# Patient Record
Sex: MALE | Race: WHITE
[De-identification: names, ages, dates, MRNs, and addresses within clinical notes are randomized per-mention and may not be internally consistent; named-entity substitution may affect disease eponyms.]

---

## 2018-03-06 NOTE — MH
cc:

Sean Lima MD

****

 

 

DATE OF ADMISSION:

03/12/2018

 

ADMISSION DIAGNOSIS:

Cataract left eye.

 

HISTORY OF PRESENT ILLNESS:

This 77-year-old white male is coming through [facility] for the purpose of a lens extraction of the left eye with 

intraocular lens implant under local anesthesia.  He has noticed 

decreasing visual acuity interfering with his daily activities and 

elected to have the above procedure.  His best corrected visual acuity

is 20/40 -2 in the right eye and 20/50 -1 in the left eye in room 

light.

 

PAST MEDICAL HISTORY:

The patient has a history of hypertension, ankylosing spondylitis, 

atrial fibrillation, cholesterol problems, and Crohn's disease.

 

PAST SURGICAL HISTORY:

Bilateral hip replacement and revisions, intestinal surgery for 

Crohn's disease, hernia surgery twice.

 

DAILY MEDICATIONS:

Losartan, hydrochlorothiazide, Levitra, B-complex, hydrocodone p.r.n.,

Crestor, propafenone, Eliquis, amlodipine, furosemide, Flonase nasal 

spray.

 

ALLERGIES:

LISINOPRIL

 

SOCIAL HISTORY:

He does not smoke and drinks alcohol twice a month.

 

FAMILY HISTORY:

Positive for father with cataract and macular degeneration.

 

REVIEW OF SYSTEMS:

HEAD:  Patient denies severe headaches, dizziness or recent head 

injury.

EARS:  Patient denies hearing loss, ear pain, discharge.  The patient 

has tinnitus, ringing in the ears bilaterally for many years.

NOSE:  Patient denies nasal discharge, obstruction or frequent colds.

MOUTH AND THROAT:  Patient denies soreness of the mouth or tongue, 

bleeding gums, trouble swallowing, changes in voice or sore throat.

NECK:  Patient denies neck pain or swelling.  Due to his ankylosing 

spondylitis he has some limitation of neck movement. No neck injury.

CARDIOPULMONARY SYSTEM:  Patient denies shortness of breath, 

orthopnea, chronic cough, sputum production, hemoptysis, chest pain, 

wheezing, palpitations or light-headedness.

GI SYSTEM:  Patient denies poor appetite, nausea, vomiting, abdominal 

pain, ulcers, hemorrhoids or change in bowel habits.

 SYSTEM:  The patient has urinary frequency day and gets up twice at

night to urinate. No dysuria, change in urine color.

NERVOUS SYSTEM:  Patient denies convulsions, vertigo, stroke, numbness

or weakness.

 

PHYSICAL EXAMINATION:

VITAL SIGNS:  Blood pressure 122/64, pulse 84, respirations 16.

HEAD: Normocephalic, atraumatic.

NOSE:  Without rhinorrhea.

THROAT:  Clear.

NECK: Supple.

CHEST:  Clear.

HEART:  Regular rhythm.

ABDOMEN: Without tenderness.

EXTREMITIES:  Without edema.

NEUROLOGIC: Within normal limits.

MENTAL STATUS:  Within normal limits.

 

EYE EXAMINATION:

The patient's best corrected visual acuity is 20/40 -2 in the right 

eye and 20/50 -1 in the left eye in room light.  Visual fields are 

full to confrontation testing.  Extraocular muscle exam reveals full 

versions with orthophoria in the distance and exophoria at near.  

Pupils are 2 mm in the right eye and 2.5 mm in the left, round and 

reactive to light without afferent defect.  Anterior segment 

examination reveals dermatochalasis of the eyelid skin. There are 

nuclear sclerotic and posterior subcapsular cataract changes 

bilaterally.  Intraocular pressure is 17 in the right eye and 14 in 

the left by applanation tonometry.  Dilated fundus exam reveals sharp 

disks with cup-to-disk ratio of 0.3 bilaterally.  There are drusen 

present in the macula bilaterally.  The background is within normal 

limits.

 

IMPRESSION:

1.  Bilateral cataracts.

2.  Macular drusen, both eyes.

3.  Dermatochalasis.

 

PLAN:

Lens extraction of the left eye with intraocular lens implant under 

local anesthesia through [facility].   The patient has

been cleared medically.  He has been counseled as to the risks, 

benefits and alternatives and elected to proceed.  I feel that 

cataract surgery will improve the quality of life and activities of 

daily living in this patient.

 

 

 

 

 

 

 

 

 

__________________________________

MD ELDA Aponte/CHARLOTTE/cheyenne

D: 03/06/2018, 12:06 PM

T: 03/06/2018, 01:05 PM

Visit #: [id number]

Job #: [id number]

## 2018-03-12 ENCOUNTER — HOSPITAL ENCOUNTER (OUTPATIENT)
Dept: HOSPITAL 17 - PHSDC | Age: 78
Discharge: HOME | End: 2018-03-12
Attending: OPHTHALMOLOGY
Payer: MEDICARE

## 2018-03-12 VITALS — WEIGHT: 175.27 LBS | BODY MASS INDEX: 25.96 KG/M2 | HEIGHT: 69 IN

## 2018-03-12 VITALS
TEMPERATURE: 97.6 F | SYSTOLIC BLOOD PRESSURE: 122 MMHG | HEART RATE: 70 BPM | RESPIRATION RATE: 18 BRPM | DIASTOLIC BLOOD PRESSURE: 78 MMHG | OXYGEN SATURATION: 95 %

## 2018-03-12 VITALS — HEART RATE: 70 BPM

## 2018-03-12 VITALS — HEART RATE: 66 BPM

## 2018-03-12 DIAGNOSIS — H26.9: Primary | ICD-10-CM

## 2018-03-12 PROCEDURE — V2632 POST CHMBR INTRAOCULAR LENS: HCPCS

## 2018-03-12 PROCEDURE — 00142 ANES PX ON EYE LENS SURGERY: CPT

## 2018-03-12 PROCEDURE — 66984 XCAPSL CTRC RMVL W/O ECP: CPT

## 2018-03-12 RX ADMIN — CYCLOPENTOLATE HYDROCHLORIDE SCH DROP: 10 SOLUTION/ DROPS OPHTHALMIC at 07:02

## 2018-03-12 RX ADMIN — DICLOFENAC SODIUM SCH DROP: 1 SOLUTION/ DROPS OPHTHALMIC at 06:59

## 2018-03-12 RX ADMIN — TROPICAMIDE SCH DROP: 10 SOLUTION/ DROPS OPHTHALMIC at 06:59

## 2018-03-12 RX ADMIN — TROPICAMIDE SCH DROP: 10 SOLUTION/ DROPS OPHTHALMIC at 07:05

## 2018-03-12 RX ADMIN — DICLOFENAC SODIUM SCH DROP: 1 SOLUTION/ DROPS OPHTHALMIC at 07:02

## 2018-03-12 RX ADMIN — PHENYLEPHRINE HYDROCHLORIDE SCH DROP: 25 SOLUTION/ DROPS OPHTHALMIC at 06:56

## 2018-03-12 RX ADMIN — PHENYLEPHRINE HYDROCHLORIDE SCH DROP: 25 SOLUTION/ DROPS OPHTHALMIC at 06:59

## 2018-03-12 RX ADMIN — CYCLOPENTOLATE HYDROCHLORIDE SCH DROP: 10 SOLUTION/ DROPS OPHTHALMIC at 07:05

## 2018-03-12 RX ADMIN — GATIFLOXACIN SCH DROP: 5 SOLUTION/ DROPS OPHTHALMIC at 06:56

## 2018-03-12 RX ADMIN — TROPICAMIDE SCH DROP: 10 SOLUTION/ DROPS OPHTHALMIC at 06:56

## 2018-03-12 RX ADMIN — DICLOFENAC SODIUM SCH DROP: 1 SOLUTION/ DROPS OPHTHALMIC at 06:56

## 2018-03-12 RX ADMIN — GATIFLOXACIN SCH DROP: 5 SOLUTION/ DROPS OPHTHALMIC at 07:02

## 2018-03-12 RX ADMIN — CYCLOPENTOLATE HYDROCHLORIDE SCH DROP: 10 SOLUTION/ DROPS OPHTHALMIC at 06:56

## 2018-03-12 RX ADMIN — PHENYLEPHRINE HYDROCHLORIDE SCH DROP: 25 SOLUTION/ DROPS OPHTHALMIC at 07:02

## 2018-03-12 RX ADMIN — DICLOFENAC SODIUM SCH DROP: 1 SOLUTION/ DROPS OPHTHALMIC at 07:05

## 2018-03-12 RX ADMIN — TROPICAMIDE SCH DROP: 10 SOLUTION/ DROPS OPHTHALMIC at 07:02

## 2018-03-12 RX ADMIN — PHENYLEPHRINE HYDROCHLORIDE SCH DROP: 25 SOLUTION/ DROPS OPHTHALMIC at 07:05

## 2018-03-12 RX ADMIN — GATIFLOXACIN SCH DROP: 5 SOLUTION/ DROPS OPHTHALMIC at 07:05

## 2018-03-12 RX ADMIN — CYCLOPENTOLATE HYDROCHLORIDE SCH DROP: 10 SOLUTION/ DROPS OPHTHALMIC at 06:59

## 2018-03-12 RX ADMIN — GATIFLOXACIN SCH DROP: 5 SOLUTION/ DROPS OPHTHALMIC at 06:59

## 2018-03-12 NOTE — MP
cc:

Sean Lima MD

****

 

 

DATE OF OPERATION:

03/12/2018

 

PREOPERATIVE DIAGNOSIS:

Cataract left eye.

 

POSTOPERATIVE DIAGNOSIS:

Cataract left eye.

 

OPERATION:

Extracapsular cataract extraction with posterior chamber intraocular 

lens implant by phacoemulsification, left eye.

 

SURGEON:

Sean Lima M.D.

 

ANESTHESIA:

Local.

 

COMPLICATIONS:

None.

 

INDICATIONS:

See history and physical previously dictated.

 

OPERATIVE PROCEDURE:

The patient had adequate retrobulbar and eyelid blocks administered in

the holding area and was brought to the operating room.  The left eye 

was prepped and draped in the usual sterile ophthalmic manner.  A lid 

speculum was inserted in the left eye.  A 4-0 silk bridle suture was 

placed through the conjunctiva near the superior rectus muscle and it 

was tagged to the drape.  A fornix-based conjunctival flap was 

prepared spanning approximately 5 mm in width.  Hemostasis was 

obtained with wet-field cautery.  A 3.5 mm groove was made 1 mm from 

the limbus and dissected up to the limbus in the form of a scleral 

pocket incision.  A stab incision was then made at the 2 o'clock 

position.  Viscoelastic was injected into the anterior chamber.  The 

anterior chamber was entered with a 2.75 mm keratome through the 

scleral pocket incision.  A 360 degree continuous curvilinear 

capsulorrhexis was then performed.  Hydrodissection was utilized to 

divide the nucleus into inner and outer components and to separate the

cortex from the capsule.  Phacoemulsification was then utilized to 

remove the nucleus.  The outer nuclear layer was removed with 

irrigation and aspiration and short bursts of ultrasound as necessary.

 The cortex was removed with the irrigation/aspiration handpiece.  The

posterior capsule was polished with the capsule polisher.  

Viscoelastic was injected into the capsular bag.  The intraocular lens

was inspected and found to be in good condition.  The lens utilized 

was an Tommy, model number SA60AT with a power of +19.5 Diopters.  The

lens was inserted into the capsular bag.  The viscoelastic in the 

anterior chamber was then removed with the irrigation-aspiration 

handpiece.  Viscoelastic was also removed from beneath the intraocular

lens.  The anterior chamber was filled with Miochol-E through the stab

incision and pressurized.  The wound was checked for leaks at this 

pressure and normalized pressure and there were none.  The 4-0 bridle 

suture was removed.  The conjunctival flap was brought down over the 

wound and secured with cautery.  Pilocarpine 2% eye drops were 

instilled topically.  The lid speculum was removed.  TobraDex 

ophthalmic ointment was applied.  The eye was double patched and 

shielded.

 

The patient tolerated the procedure well and left the Operating Room 

in satisfactory condition.

 

Note:  A side port incision was made at the 4 o'clock position.

 

 

__________________________________

MD ELDA Aponte/CIARRA

D: 03/12/2018, 09:04 AM

T: 03/12/2018, 09:18 AM

Visit #: [id number]

Job #: [id number]

## 2022-06-27 ENCOUNTER — APPOINTMENT (RX ONLY)
Dept: URBAN - METROPOLITAN AREA CLINIC 52 | Facility: CLINIC | Age: 82
Setting detail: DERMATOLOGY
End: 2022-06-27

## 2022-06-27 DIAGNOSIS — L57.8 OTHER SKIN CHANGES DUE TO CHRONIC EXPOSURE TO NONIONIZING RADIATION: ICD-10-CM | Status: INADEQUATELY CONTROLLED

## 2022-06-27 DIAGNOSIS — L57.0 ACTINIC KERATOSIS: ICD-10-CM | Status: INADEQUATELY CONTROLLED

## 2022-06-27 PROCEDURE — ? LIQUID NITROGEN

## 2022-06-27 PROCEDURE — 99203 OFFICE O/P NEW LOW 30 MIN: CPT | Mod: 25

## 2022-06-27 PROCEDURE — ? COUNSELING

## 2022-06-27 PROCEDURE — 17000 DESTRUCT PREMALG LESION: CPT

## 2022-06-27 PROCEDURE — ? SUNSCREEN RECOMMENDATIONS

## 2022-06-27 PROCEDURE — ? FOLLOW UP FOR NEXT VISIT

## 2022-06-27 ASSESSMENT — LOCATION DETAILED DESCRIPTION DERM: LOCATION DETAILED: LEFT POSTERIOR ANKLE

## 2022-06-27 ASSESSMENT — LOCATION SIMPLE DESCRIPTION DERM: LOCATION SIMPLE: LEFT ANKLE

## 2022-06-27 ASSESSMENT — LOCATION ZONE DERM: LOCATION ZONE: LEG

## 2022-06-27 NOTE — PROCEDURE: LIQUID NITROGEN
Render Note In Bullet Format When Appropriate: No
Post-Care Instructions: I reviewed with the patient in detail post-care instructions. Patient is to wear sunprotection, and avoid picking at any of the treated lesions. Pt may apply Vaseline to crusted or scabbing areas.
Render Post-Care Instructions In Note?: yes
Duration Of Freeze Thaw-Cycle (Seconds): 4
Number Of Freeze-Thaw Cycles: 1 freeze-thaw cycle
Detail Level: Detailed
Consent: The patient's consent was obtained including but not limited to risks of crusting, scabbing, blistering, scarring, darker or lighter pigmentary change, recurrence, incomplete removal and infection.

## 2022-06-27 NOTE — PROCEDURE: FOLLOW UP FOR NEXT VISIT
Detail Level: Detailed
Scheduled For Follow Up In (Optional): 2 month further evaluation and management

## 2022-08-29 ENCOUNTER — APPOINTMENT (RX ONLY)
Dept: URBAN - METROPOLITAN AREA CLINIC 52 | Facility: CLINIC | Age: 82
Setting detail: DERMATOLOGY
End: 2022-08-29

## 2022-08-29 ENCOUNTER — RX ONLY (OUTPATIENT)
Age: 82
Setting detail: RX ONLY
End: 2022-08-29

## 2022-08-29 DIAGNOSIS — D18.0 HEMANGIOMA: ICD-10-CM | Status: STABLE

## 2022-08-29 DIAGNOSIS — L85.3 XEROSIS CUTIS: ICD-10-CM | Status: INADEQUATELY CONTROLLED

## 2022-08-29 DIAGNOSIS — L57.0 ACTINIC KERATOSIS: ICD-10-CM | Status: RESOLVING

## 2022-08-29 DIAGNOSIS — D69.2 OTHER NONTHROMBOCYTOPENIC PURPURA: ICD-10-CM | Status: INADEQUATELY CONTROLLED

## 2022-08-29 DIAGNOSIS — L82.1 OTHER SEBORRHEIC KERATOSIS: ICD-10-CM

## 2022-08-29 DIAGNOSIS — L57.8 OTHER SKIN CHANGES DUE TO CHRONIC EXPOSURE TO NONIONIZING RADIATION: ICD-10-CM | Status: INADEQUATELY CONTROLLED

## 2022-08-29 PROBLEM — D18.01 HEMANGIOMA OF SKIN AND SUBCUTANEOUS TISSUE: Status: ACTIVE | Noted: 2022-08-29

## 2022-08-29 PROCEDURE — ? COUNSELING

## 2022-08-29 PROCEDURE — 99214 OFFICE O/P EST MOD 30 MIN: CPT | Mod: 25

## 2022-08-29 PROCEDURE — 17000 DESTRUCT PREMALG LESION: CPT

## 2022-08-29 PROCEDURE — ? PRESCRIPTION MEDICATION MANAGEMENT

## 2022-08-29 PROCEDURE — ? SUNSCREEN RECOMMENDATIONS

## 2022-08-29 PROCEDURE — 17003 DESTRUCT PREMALG LES 2-14: CPT

## 2022-08-29 PROCEDURE — ? FOLLOW UP FOR NEXT VISIT

## 2022-08-29 PROCEDURE — ? TREATMENT REGIMEN

## 2022-08-29 PROCEDURE — ? LIQUID NITROGEN

## 2022-08-29 PROCEDURE — ? ADDITIONAL NOTES

## 2022-08-29 PROCEDURE — ? PRESCRIPTION

## 2022-08-29 RX ORDER — AMMONIUM LACTATE 12 G/100G
1 CREAM TOPICAL BID
Qty: 140 | Refills: 3 | Status: ERX | COMMUNITY
Start: 2022-08-29

## 2022-08-29 RX ORDER — AMMONIUM LACTATE 12 %
LOTION (GRAM) TOPICAL
Qty: 396 | Refills: 0 | Status: CANCELLED

## 2022-08-29 ASSESSMENT — LOCATION DETAILED DESCRIPTION DERM
LOCATION DETAILED: LEFT PROXIMAL DORSAL FOREARM
LOCATION DETAILED: RIGHT PROXIMAL PRETIBIAL REGION
LOCATION DETAILED: LEFT DISTAL PRETIBIAL REGION
LOCATION DETAILED: RIGHT SUPERIOR LATERAL FOREHEAD
LOCATION DETAILED: RIGHT MEDIAL FOREHEAD
LOCATION DETAILED: RIGHT PROXIMAL DORSAL FOREARM
LOCATION DETAILED: RIGHT DISTAL PRETIBIAL REGION
LOCATION DETAILED: LEFT POSTERIOR SHOULDER
LOCATION DETAILED: LEFT MEDIAL PROXIMAL PRETIBIAL REGION
LOCATION DETAILED: LEFT PROXIMAL PRETIBIAL REGION

## 2022-08-29 ASSESSMENT — LOCATION ZONE DERM
LOCATION ZONE: LEG
LOCATION ZONE: FACE
LOCATION ZONE: ARM
LOCATION ZONE: ARM

## 2022-08-29 ASSESSMENT — LOCATION SIMPLE DESCRIPTION DERM
LOCATION SIMPLE: RIGHT FOREHEAD
LOCATION SIMPLE: RIGHT PRETIBIAL REGION
LOCATION SIMPLE: RIGHT FOREARM
LOCATION SIMPLE: LEFT PRETIBIAL REGION
LOCATION SIMPLE: LEFT FOREARM
LOCATION SIMPLE: LEFT SHOULDER

## 2022-08-29 ASSESSMENT — BSA RASH: BSA RASH: 5

## 2022-08-29 NOTE — HPI: EVALUATION OF SKIN LESION(S)
What Type Of Note Output Would You Prefer (Optional)?: Standard Output
Hpi Title: Evaluation of Skin Lesions
How Severe Are Your Spot(S)?: mild
Have Your Spot(S) Been Treated In The Past?: has not been treated
Year Removed: 1900
Additional History: treated LN2 site x 1 with antibiotic cream then left alone  Not healing

## 2022-08-29 NOTE — PROCEDURE: PRESCRIPTION MEDICATION MANAGEMENT
Initiate Treatment: ammonium lactate 12 % lotion \\nQuantity: 396.0 g  Days Supply: 30\\nSig: Apply to legs and arms bid\\n\\nOTC Glycolic acid body lotion
Discontinue Regimen: short length clothing
Render In Strict Bullet Format?: No
Detail Level: Zone
Initiate Treatment: ammoniated lactate 12% cream bid \\nUVR protection

## 2022-08-29 NOTE — PROCEDURE: TREATMENT REGIMEN
Detail Level: Zone
Otc Regimen: Begin the following treatments: OTC emollient or Ammoniated Lactate 12% BID as directed.\\nPlan: emollient acidifying cleansers\\npo water ~3 litres per day.
Detail Level: Detailed
Initiate Treatment: Wear Sun Protective clothing\\n20 minutes prior to UV exposure, apply full spectrum sunscreen with SPF at least 15 Repeat every 2 hours
Otc Regimen: Begin the following treatments: Sunscreen\\nAHA body lotion and face cream.\\nPlan: Work with    re sunscreen cosmeceuticals and treatments.
Initiate Treatment: post LN2 site with firm crust :  wet/dry soaks + Vaseline until crusts is removed  then  keep site clean + Vaseline frequently

## 2022-08-29 NOTE — PROCEDURE: LIQUID NITROGEN
Duration Of Freeze Thaw-Cycle (Seconds): 4
Show Aperture Variable?: Yes
Detail Level: Detailed
Consent: The patient's consent was obtained including but not limited to risks of crusting, scabbing, blistering, scarring, darker or lighter pigmentary change, recurrence, incomplete removal and infection.
Post-Care Instructions: I reviewed with the patient in detail post-care instructions. Patient is to wear sunprotection, and avoid picking at any of the treated lesions. Pt may apply Vaseline to crusted or scabbing areas.
Number Of Freeze-Thaw Cycles: 1 freeze-thaw cycle
Render Note In Bullet Format When Appropriate: No

## 2022-10-27 ENCOUNTER — APPOINTMENT (RX ONLY)
Dept: URBAN - METROPOLITAN AREA CLINIC 52 | Facility: CLINIC | Age: 82
Setting detail: DERMATOLOGY
End: 2022-10-27

## 2022-10-27 DIAGNOSIS — Z02.9 ENCOUNTER FOR ADMINISTRATIVE EXAMINATIONS, UNSPECIFIED: ICD-10-CM

## 2022-12-12 ENCOUNTER — APPOINTMENT (RX ONLY)
Dept: URBAN - METROPOLITAN AREA CLINIC 52 | Facility: CLINIC | Age: 82
Setting detail: DERMATOLOGY
End: 2022-12-12

## 2022-12-12 DIAGNOSIS — D69.2 OTHER NONTHROMBOCYTOPENIC PURPURA: ICD-10-CM | Status: IMPROVED

## 2022-12-12 DIAGNOSIS — H01.13 ECZEMATOUS DERMATITIS OF EYELID: ICD-10-CM

## 2022-12-12 DIAGNOSIS — L85.3 XEROSIS CUTIS: ICD-10-CM | Status: IMPROVED

## 2022-12-12 PROBLEM — H01.139 ECZEMATOUS DERMATITIS OF UNSPECIFIED EYE, UNSPECIFIED EYELID: Status: ACTIVE | Noted: 2022-12-12

## 2022-12-12 PROCEDURE — ? TREATMENT REGIMEN

## 2022-12-12 PROCEDURE — ? PRESCRIPTION

## 2022-12-12 PROCEDURE — ? COUNSELING

## 2022-12-12 PROCEDURE — ? FOLLOW UP FOR NEXT VISIT

## 2022-12-12 PROCEDURE — ? PRESCRIPTION MEDICATION MANAGEMENT

## 2022-12-12 PROCEDURE — 99214 OFFICE O/P EST MOD 30 MIN: CPT

## 2022-12-12 RX ORDER — PIMECROLIMUS 10 MG/G
1 CREAM TOPICAL BID
Qty: 30 | Refills: 4 | Status: ERX | COMMUNITY
Start: 2022-12-12

## 2022-12-12 RX ADMIN — PIMECROLIMUS 1: 10 CREAM TOPICAL at 00:00

## 2022-12-12 ASSESSMENT — LOCATION SIMPLE DESCRIPTION DERM
LOCATION SIMPLE: RIGHT FOREARM
LOCATION SIMPLE: LEFT FOREARM
LOCATION SIMPLE: LEFT CHEEK
LOCATION SIMPLE: RIGHT PRETIBIAL REGION
LOCATION SIMPLE: LEFT PRETIBIAL REGION
LOCATION SIMPLE: RIGHT CHEEK

## 2022-12-12 ASSESSMENT — LOCATION DETAILED DESCRIPTION DERM
LOCATION DETAILED: RIGHT SUPERIOR MEDIAL MALAR CHEEK
LOCATION DETAILED: RIGHT PROXIMAL DORSAL FOREARM
LOCATION DETAILED: LEFT PROXIMAL PRETIBIAL REGION
LOCATION DETAILED: RIGHT DISTAL PRETIBIAL REGION
LOCATION DETAILED: LEFT SUPERIOR MEDIAL MALAR CHEEK
LOCATION DETAILED: LEFT DISTAL PRETIBIAL REGION
LOCATION DETAILED: LEFT PROXIMAL DORSAL FOREARM
LOCATION DETAILED: RIGHT PROXIMAL PRETIBIAL REGION

## 2022-12-12 ASSESSMENT — LOCATION ZONE DERM
LOCATION ZONE: FACE
LOCATION ZONE: ARM
LOCATION ZONE: LEG

## 2022-12-12 NOTE — PROCEDURE: FOLLOW UP FOR NEXT VISIT
Instructions (Optional): Follow up
Scheduled For Follow Up In (Optional): 2 months
Detail Level: Generalized

## 2022-12-12 NOTE — PROCEDURE: PRESCRIPTION MEDICATION MANAGEMENT
Initiate Treatment: \\n
Discontinue Regimen: short length clothing
Continue Regimen: ammonium lactate 12 % lotion \\nQuantity: 396.0 g  Days Supply: 30\\nSig: Apply to legs and arms bid\\n\\nOTC Glycolic acid body lotion BID\\n
Plan: Switching to a cleanser
Render In Strict Bullet Format?: No
Detail Level: Zone
Plan: Switching to a cleanser, continue glycolic acid pads.
Continue Regimen: ammoniated lactate 12% cream bid \\nUVR protection
Initiate Treatment: pimecrolimus 1 % topical cream Bid
Plan: We spoke about trying not to rub eyes, spoke about cosmetics surgery and possible lasers, Celia also might have something that could help with eye puffiness.

## 2022-12-12 NOTE — PROCEDURE: TREATMENT REGIMEN
Detail Level: Detailed
Continue Regimen: Wear Sun Protective clothing\\n20 minutes prior to UV exposure, apply full spectrum sunscreen with SPF at least 15 Repeat every 2 hours
Detail Level: Zone
Otc Regimen: Begin the following treatments: OTC emollient or Ammonium Lactate 12% BID as directed.\\nPlan: emollient acidifying cleansers\\npo water ~3 litres per day.

## 2022-12-15 ENCOUNTER — APPOINTMENT (RX ONLY)
Dept: URBAN - METROPOLITAN AREA CLINIC 52 | Facility: CLINIC | Age: 82
Setting detail: DERMATOLOGY
End: 2022-12-15

## 2022-12-15 DIAGNOSIS — H01.13 ECZEMATOUS DERMATITIS OF EYELID: ICD-10-CM

## 2022-12-15 PROBLEM — H01.139 ECZEMATOUS DERMATITIS OF UNSPECIFIED EYE, UNSPECIFIED EYELID: Status: ACTIVE | Noted: 2022-12-15

## 2022-12-15 PROCEDURE — ? PRESCRIPTION

## 2022-12-15 RX ORDER — TACROLIMUS 1 MG/G
1 OINTMENT TOPICAL BID
Qty: 30 | Refills: 1 | Status: ERX | COMMUNITY
Start: 2022-12-15

## 2022-12-15 RX ADMIN — TACROLIMUS 1: 1 OINTMENT TOPICAL at 00:00
